# Patient Record
Sex: FEMALE | Race: BLACK OR AFRICAN AMERICAN | NOT HISPANIC OR LATINO | Employment: FULL TIME | URBAN - METROPOLITAN AREA
[De-identification: names, ages, dates, MRNs, and addresses within clinical notes are randomized per-mention and may not be internally consistent; named-entity substitution may affect disease eponyms.]

---

## 2023-07-26 ENCOUNTER — OFFICE VISIT (OUTPATIENT)
Dept: BEHAVIORAL HEALTH | Facility: CLINIC | Age: 58
End: 2023-07-26
Payer: MEDICARE

## 2023-07-26 ENCOUNTER — OFFICE VISIT (OUTPATIENT)
Dept: BARIATRICS/WEIGHT MGMT | Facility: CLINIC | Age: 58
End: 2023-07-26
Payer: MEDICARE

## 2023-07-26 ENCOUNTER — DOCUMENTATION (OUTPATIENT)
Dept: BARIATRICS/WEIGHT MGMT | Facility: CLINIC | Age: 58
End: 2023-07-26
Payer: MEDICARE

## 2023-07-26 VITALS
TEMPERATURE: 97.7 F | HEIGHT: 68 IN | HEART RATE: 95 BPM | BODY MASS INDEX: 43.19 KG/M2 | DIASTOLIC BLOOD PRESSURE: 80 MMHG | WEIGHT: 285 LBS | SYSTOLIC BLOOD PRESSURE: 120 MMHG | OXYGEN SATURATION: 98 %

## 2023-07-26 DIAGNOSIS — E66.01 OBESITY, CLASS III, BMI 40-49.9 (MORBID OBESITY): Primary | ICD-10-CM

## 2023-07-26 DIAGNOSIS — E11.9 TYPE 2 DIABETES MELLITUS WITHOUT COMPLICATION, WITH LONG-TERM CURRENT USE OF INSULIN: ICD-10-CM

## 2023-07-26 DIAGNOSIS — Z71.89 ENCOUNTER FOR PSYCHOLOGICAL ASSESSMENT PRIOR TO BARIATRIC SURGERY: ICD-10-CM

## 2023-07-26 DIAGNOSIS — Z98.84 STATUS POST GASTRIC BANDING: ICD-10-CM

## 2023-07-26 DIAGNOSIS — Z79.4 TYPE 2 DIABETES MELLITUS WITHOUT COMPLICATION, WITH LONG-TERM CURRENT USE OF INSULIN: ICD-10-CM

## 2023-07-26 DIAGNOSIS — R10.13 DYSPEPSIA: ICD-10-CM

## 2023-07-26 DIAGNOSIS — F32.9 REACTIVE DEPRESSION (SITUATIONAL): ICD-10-CM

## 2023-07-26 DIAGNOSIS — E66.01 MORBID OBESITY: Primary | ICD-10-CM

## 2023-07-26 DIAGNOSIS — R53.83 FATIGUE, UNSPECIFIED TYPE: ICD-10-CM

## 2023-07-26 PROBLEM — G47.33 OSA ON CPAP: Status: ACTIVE | Noted: 2023-07-26

## 2023-07-26 PROBLEM — M10.9 GOUT: Status: ACTIVE | Noted: 2023-07-26

## 2023-07-26 PROBLEM — M54.9 BACK PAIN: Status: ACTIVE | Noted: 2023-07-26

## 2023-07-26 PROBLEM — Z99.89 OSA ON CPAP: Status: ACTIVE | Noted: 2023-07-26

## 2023-07-26 PROBLEM — E78.5 HLD (HYPERLIPIDEMIA): Status: ACTIVE | Noted: 2023-07-26

## 2023-07-26 PROBLEM — I10 HTN (HYPERTENSION): Status: ACTIVE | Noted: 2023-07-26

## 2023-07-26 PROBLEM — I25.10 CAD (CORONARY ARTERY DISEASE): Status: ACTIVE | Noted: 2023-07-26

## 2023-07-26 PROBLEM — F41.9 ANXIETY: Status: ACTIVE | Noted: 2023-07-26

## 2023-07-26 NOTE — PROGRESS NOTES
[facility] BARIATRIC SURGERY  [address]  [address]  [facility] [id number] [phone]      Patient  Name:  Montserrat Amato  :  1965      Date of Visit: 2023      Chief Complaint:  weight gain; unable to maintain weight loss      History of Present Illness:  Montserrat Amato is a 57 y.o. female who presents today for evaluation, education and consultation regarding revisional metabolic and bariatric surgery with Dr. London.     Montserrat has been overweight for at least 25+ years, has been 35 pounds or more overweight for at least 25 years, has been 100 pounds or more overweight for 20 or more years and started dieting at age 26.  Previous diet attempts include: Low Carbohydrate, Low Fat, Calorie Counting, Cabbage Soup, Fasting, and Slim Fast; Weight Watchers; Dexatrim and Ionamin/Adipex.  The most weight Montserrat lost was 40 pounds but was unable to maintain that weight loss.  Her maximum lifetime weight is 303 pounds.      Laparoscopic adjustable gastric band placement by Dr. Early in [place] in .  Patient has not had any band fills in several years due to issues with dysphagia in the past.  She currently denies any issues with heartburn, dysphagia, port site tenderness or redness.    Weight prior to lapband: 285; lowest weight achieved 244lbs     She reports recently having a gallbladder ultrasound at [place] for ongoing issues with postprandial nausea following fatty foods.  She cannot recall if she had a HIDA scan.    She has a history of coronary artery disease and is currently on aspirin and Plavix.  She denies ever having any stent placement.  She follows with Gabbie Patton at [facility].  Other past medical history significant for hypertension, hyperlipidemia, obstructive sleep apnea on CPAP, gout on allopurinol, chronic low back pain, anxiety.    She is a type II diabetic and was diagnosed in .  She has been on insulin since 2017.     Complete  history has been obtained and discussed today, as pertinent to metabolic/ bariatric surgery.     Past Medical History:   Diagnosis Date    Anxiety     Back pain     hydrocodone; gabapentin    CAD (coronary artery disease)     no stents; ASA and Plavix. Dr. Gabbie Patton in [facility]    Dyspepsia     Gout     allopurinol    HLD (hyperlipidemia)     HTN (hypertension)     YNES on CPAP     Status post gastric banding     no fills since 2013 due to dysphagia. Dr. Early    Type 2 diabetes mellitus     dx in 2013; on insulin since 2017;     Past Surgical History:   Procedure Laterality Date    COLONOSCOPY  2022    HYSTERECTOMY  2003    total; laparoscopic    LAPAROSCOPIC GASTRIC BANDING  2012    Dr. Early       Allergies   Allergen Reactions    Aspartame Itching       Current Outpatient Medications:     allopurinol (ZYLOPRIM) 300 MG tablet, Take 1 tablet by mouth Daily., Disp: , Rfl:     aspirin 81 MG EC tablet, Take 1 tablet by mouth Daily., Disp: , Rfl:     atorvastatin (LIPITOR) 80 MG tablet, Take 1 tablet by mouth Daily., Disp: , Rfl:     clopidogrel (PLAVIX) 75 MG tablet, Take 1 tablet by mouth Daily., Disp: , Rfl:     dilTIAZem XR (DILACOR XR) 240 MG 24 hr capsule, Take 1 capsule by mouth Daily., Disp: , Rfl:     empagliflozin (JARDIANCE) 25 MG tablet tablet, Take 1 tablet by mouth Daily., Disp: , Rfl:     enalapril (VASOTEC) 20 MG tablet, Take 1 tablet by mouth Daily., Disp: , Rfl:     gabapentin (NEURONTIN) 600 MG tablet, Take 1 tablet by mouth 3 (Three) Times a Day., Disp: , Rfl:     HYDROcodone-acetaminophen (NORCO) 7.5-325 MG per tablet, Take 1 tablet by mouth Every 6 (Six) Hours As Needed for Moderate Pain., Disp: , Rfl:     hydroxychloroquine (PLAQUENIL) 200 MG tablet, Take 1 tablet by mouth 2 (Two) Times a Day., Disp: , Rfl:     Insulin Glargine (LANTUS SOLOSTAR) 100 UNIT/ML injection pen, Inject 32 Units under the skin into the appropriate area as directed 2 (Two) Times a Day., Disp: , Rfl:      vitamin D (ERGOCALCIFEROL) 1.25 MG (06500 UT) capsule capsule, Take 1 capsule by mouth 1 (One) Time Per Week., Disp: , Rfl:     Social History     Socioeconomic History    Marital status:    Tobacco Use    Smoking status: Former     Years: 15.00     Types: Cigarettes     Quit date:      Years since quittin.5    Smokeless tobacco: Never   Vaping Use    Vaping Use: Never used   Substance and Sexual Activity    Alcohol use: Never    Drug use: Never    Sexual activity: Defer     Social History     Social History Narrative    Pt currently resides in [place], [place]. She is  with 2 children. She is currently disabled.        Family History   Problem Relation Age of Onset    Diabetes Mother     Stroke Mother     Heart attack Mother     Diabetes Father     Hypertension Father     Diabetes Sister     Hypertension Sister     Heart attack Sister     Heart attack Maternal Grandmother     Aneurysm Maternal Grandmother     No Known Problems Maternal Grandfather     Breast cancer Paternal Grandmother     Alzheimer's disease Paternal Grandfather        Review of Systems:  Constitutional:  reports fatigue, weight gain and denies fevers, chills.  HEENT:  denies headache, ear pain or loss of hearing, blurred or double vision, nasal discharge or sore throat.  Cardiovascular:  reports HTN, HLD, CAD and denies Atrial Fib, hx heart disease, heart murmur, hx MI, chest pain, palpitations, edema, hx DVT.  Respiratory:  reports dyspnea on exertion, sleep apnea and denies shortness of breath , cough , wheezing, asthma, COPD, hx PE.  Gastrointestinal:  reports dysphagia and denies heartburn, nausea, vomiting, abdominal pain, IBS, diarrhea, constipation, melena, blood in stool, gallbladder issues, liver disease, hx pancreatitis.  Genitourinary:  reports none and denies history of  frequent UTI, incontinence, hematuria, dysuria, polyuria, polydipsia, renal insufficiency, renal failure.    Musculoskeletal:  reports joint  pain, back pain and denies fibromyalgia, arthritis, and autoimmune disease.  Neurological:  reports headaches and denies migraines, numbness /tingling, dizziness, confusion, seizure, stroke.  Psychiatric:  reports hx anxiety and denies depressed mood, hx depression, feeling anxious, bipolar disorder, suicidal ideation, hx suicide attempt, hx self injury, hx substance abuse, eating disorder.  Endocrine:  reports diabetes, gout and denies PCOS, endometriosis, glucose intolerance, thyroid disease.  Hematologic:  reports none and denies bruising, bleeding disorder, hx anemia, hx blood transfusion.  Skin:  reports none and denies rashes, hx MRSA.    Physical Exam:  Vital Signs:  Weight: 129 kg (285 lb)   Body mass index is 43.98 kg/m².  Temp: 97.7 °F (36.5 °C)   Heart Rate: 95   BP: 120/80     Physical Exam  Constitutional:       Appearance: She is obese.   HENT:      Head: Normocephalic and atraumatic.   Eyes:      Extraocular Movements: Extraocular movements intact.      Conjunctiva/sclera: Conjunctivae normal.   Cardiovascular:      Rate and Rhythm: Normal rate and regular rhythm.   Pulmonary:      Effort: Pulmonary effort is normal.      Breath sounds: Normal breath sounds.   Abdominal:      General: Bowel sounds are normal. There is no distension.      Palpations: Abdomen is soft. There is no mass.      Tenderness: There is no abdominal tenderness.      Comments: Old lap scars; port site with no erythema or tenderness   Musculoskeletal:         General: Normal range of motion.      Cervical back: Normal range of motion and neck supple.   Skin:     General: Skin is warm and dry.   Neurological:      General: No focal deficit present.      Mental Status: She is alert and oriented to person, place, and time.   Psychiatric:         Mood and Affect: Mood normal.         Behavior: Behavior normal.         Thought Content: Thought content normal.         Judgment: Judgment normal.       Patient Active Problem List    Diagnosis    Type 2 diabetes mellitus    HTN (hypertension)    HLD (hyperlipidemia)    Gout    Dyspepsia    CAD (coronary artery disease)    Back pain    Anxiety    YNES on CPAP    Status post gastric banding    Obesity, Class III, BMI 40-49.9 (morbid obesity)    Fatigue       Assessment:  57 y.o. female with medically complicated obesity pursuing Revision.    Metabolic & Bariatric Surgery is deemed medically necessary given the following: Class 3 Severe Obesity (BMI >=40). Obesity-related health conditions include the following: obstructive sleep apnea, hypertension, coronary heart disease, diabetes mellitus, and dyslipidemias. Obesity is worsening. BMI is is above average; BMI management plan is completed. We discussed consulting a Bariatric surgeon.          Plan:  Further evaluation will include: CBC, CMP, Lipids, TSH, HgA1C, H.Pylori serum, Pulmonary Function Testing, Gallbladder Eval, Gastric Emptying Study, UGI, and EGD.    Additional clearances needed prior to surgery will include: Cardiology.     **Patient currently has Lapband in place. Will proceed with UGI, followed by EGD. Dr. London to review op note once obtained. Likely to proceed with LSG     Reports recent GBUS at [place]. Will attempt to obtain records and proceed from there.     Patient understands that bariatric surgery is not cosmetic surgery but rather a tool to help make a lifelong commitment to lifestyle changes including diet, exercise and behavior modifications.  The patient has been educated today on those expected postoperative lifestyle changes.  Psychological and Nutritional consultations will be completed prior to surgery.  Instructions on how to access [employer] (an internet based site w/ educational surgical videos) were given to the patient.  Recommended perioperative vitamin supplementation was reviewed.  The importance of avoiding ASA/ NSAIDS/ steroids/ tobacco/nicotine/ hormones/ immunomodulators perioperatively was discussed in  detail.  All questions/concerns have been addressed.      Further input to follow pending the above.           COLETTE Vides

## 2023-07-26 NOTE — PROGRESS NOTES
Bariatric Nutrition Consult     Name: Montserrat Amato   : 1965   AGE: 57 y.o.   MRN: [id number]      Consult Date: 2023     Surgery desired: sleeve    Height: 171.5cm                  Current weight: 285lbs                    BMI: 43.98    Highest weight: unknown                           Lowest weight: 205lbs    Goals: pt wants to lose 130-140lbs        No past medical history on file.                              Diet history reveals 2-3 meals and snacks daily  Breakfast: 3 eggs, landeros with toast and butter/mcmuffin-sausage egg cheese    Lunch: bologna or turkey sandwich wit lettuce tomato onion on 2 sl wheat bread    Dinner: 2 pieces of baked chicken or fish green beans and mashed potatoes    Snacks: chips, pretzels, cheese, crackers, greek yogurt, nuts          Protein sources: meat, fish, chicken, cheese, eggs    Drinks: 3x Aundrea 8, water, occasionally sweet tea    Food allergies/intolerances: none    Night eating: occasionally    Patient has/has not been diagnosed with an eating disorder: no    Exercise/activity: no    Main bariatric nutrition principles discussed and explained. Patient needs to focus on 100g protein daily, 100-140g carbohydrates daily, healthy fat intake, 64 oz fluid daily, no carbonation, and try protein drinks/protein powders. Avoid high fructose corn syrup. Patient verbalized understanding and queries were answered.  Additional nutritional counseling will be available      Justina Montaño RD,LD

## 2023-07-26 NOTE — PROGRESS NOTES
PROGRESS NOTE    Data:    Montserrat Amato is a 57 y.o. female who met with the undersigned for a scheduled psychological evaluation from 12:45 - 1:30 pm.      Clinical Maneuvering/Intervention:      Chief complaint and history of presenting illness/Problems: struggling with obesity for several years. Despite trying different weight loss plans and diets, the pt reported being unsuccessful in losing weight. A psychological evaluation was conducted in order to assess past and current level of functioning. Areas assessed included, but were not limited to: perception of social support, perception of ability to face and deal with challenges in life (positive functioning), anxiety symptoms, depressive symptoms, perspective on beliefs/belief system, coping skills for stress, intelligence level, addiction issues, etc. Therapeutic rapport was established. Interventions conducted today were geared towards assessing the pt's readiness for weight loss surgery and identifying and psychological contraindications for undergoing such a major life change. Social support was deemed strong (specific to weight loss surgery/weight loss in this manner and in a general sense): , siblings, friends. Current psychological struggles were described as low, but included: depression situational to being overweight. At the same time, she talked about how blessed she is in life, that her marriage is satisfying, that she feels supported in life, and how she loves raising her two adopted daughters. Additional coping skills for distress and related to undergoing a major life change such as weight loss surgery/weight loss were deemed strong and included strong isac in God, good sense of humor, follows directions well, intelligent, responsible person, maintains quality relationships with others, and believes in herself that she will be successful with weight loss surgery. The pt endorsed having characteristics of readiness to undergo major life  changes inherent in the journey of weight loss surgery. She could speak to having 'suffered enough,' and the decision to have weight loss surgery has 'clicked' for her. The pt expressed gratitude for today's visit.     Past Family and Social History:      History of family mental health problems: brother (substance abuse)    Psychosocial history: treatment of psychiatric care in the past (N/A), alcohol/substance abuse treatment in the past (N/A) , alcohol/substance abuse problems (N/A), inpatient psychiatric care (N/A).    Mental Status Exam (MSE):  Hygiene:  good  Dress: normal  Attitude:  cooperative and proactive  Motor Activity: normal  Speech: normal  Mood:   nervous and excited  Affect:  congruent  Thought Processes: normal  Thought Content:  normal  Suicidal Thoughts:  not endorsed  Homicidal Thoughts:  not endorsed  Crisis Safety Plan: not needed   Hallucinations:  none      Patient's Support Network Includes:  family, friends      Progress toward goal: there is evidence to suggest that she is taking measures to improve the quality of her life including seeking weight loss surgery      Functional Status: moderate to high      Prognosis: good with weight loss surgery    Evaluation, Diagnoses, and Ability/Capacity to Respond to Treatment:      The pt presented to be struggling with depression situational to being overweight (BMI > 40.00, morbid obesity). Results of MSE demonstrated a functional status of moderate to high. Strengths: belief in self that she will be successful with weight loss surgery, etc (see detailed list of coping skills above). Needed for growth (CPT code requirement for Weaknesses): weight loss.      From a psychological standpoint, the pt presents as a very good candidate for bariatric surgery. She is motivated for the surgery, has showed readiness for the lifestyle change in terms of starting to adjust her eating habits, and seems to have appropriate expectations of how to prepare and  how to live after surgery in order to lose weight successfully.    Treatment Plan:      Short term goals: Start improving her health by following up with her bariatric surgeon in order to receive weight loss surgery as soon as feasible/appropriate and demonstrate success with compliance to adhering to the recommended diet. Long term goals: reach a healthy weight and alleviation of depression via taking control over her health.    Carmen Higuera, PhD, LP

## 2023-07-31 LAB
ALBUMIN SERPL-MCNC: 4.3 G/DL (ref 3.8–4.9)
ALBUMIN/GLOB SERPL: 1.6 {RATIO} (ref 1.2–2.2)
ALP SERPL-CCNC: 118 IU/L (ref 44–121)
ALT SERPL-CCNC: 25 IU/L (ref 0–32)
AST SERPL-CCNC: 16 IU/L (ref 0–40)
BASOPHILS # BLD AUTO: 0.1 X10E3/UL (ref 0–0.2)
BASOPHILS NFR BLD AUTO: 1 %
BILIRUB SERPL-MCNC: 0.3 MG/DL (ref 0–1.2)
BUN SERPL-MCNC: 18 MG/DL (ref 6–24)
BUN/CREAT SERPL: 21 (ref 9–23)
CALCIUM SERPL-MCNC: 9.8 MG/DL (ref 8.7–10.2)
CHLORIDE SERPL-SCNC: 101 MMOL/L (ref 96–106)
CHOLEST SERPL-MCNC: 175 MG/DL (ref 100–199)
CO2 SERPL-SCNC: 29 MMOL/L (ref 20–29)
CREAT SERPL-MCNC: 0.85 MG/DL (ref 0.57–1)
EGFRCR SERPLBLD CKD-EPI 2021: 80 ML/MIN/1.73
EOSINOPHIL # BLD AUTO: 0.1 X10E3/UL (ref 0–0.4)
EOSINOPHIL NFR BLD AUTO: 1 %
ERYTHROCYTE [DISTWIDTH] IN BLOOD BY AUTOMATED COUNT: 15.6 % (ref 11.7–15.4)
GLOBULIN SER CALC-MCNC: 2.7 G/DL (ref 1.5–4.5)
GLUCOSE SERPL-MCNC: 150 MG/DL (ref 70–99)
H PYLORI IGA SER-ACNC: <9 UNITS (ref 0–8.9)
H PYLORI IGG SER IA-ACNC: 0.37 INDEX VALUE (ref 0–0.79)
HBA1C MFR BLD: 9.4 % (ref 4.8–5.6)
HCT VFR BLD AUTO: 46 % (ref 34–46.6)
HDLC SERPL-MCNC: 49 MG/DL
HGB BLD-MCNC: 14.9 G/DL (ref 11.1–15.9)
IMM GRANULOCYTES # BLD AUTO: 0 X10E3/UL (ref 0–0.1)
IMM GRANULOCYTES NFR BLD AUTO: 0 %
LDLC SERPL CALC-MCNC: 92 MG/DL (ref 0–99)
LYMPHOCYTES # BLD AUTO: 3.6 X10E3/UL (ref 0.7–3.1)
LYMPHOCYTES NFR BLD AUTO: 33 %
MCH RBC QN AUTO: 26.8 PG (ref 26.6–33)
MCHC RBC AUTO-ENTMCNC: 32.4 G/DL (ref 31.5–35.7)
MCV RBC AUTO: 83 FL (ref 79–97)
MONOCYTES # BLD AUTO: 0.8 X10E3/UL (ref 0.1–0.9)
MONOCYTES NFR BLD AUTO: 8 %
NEUTROPHILS # BLD AUTO: 6.2 X10E3/UL (ref 1.4–7)
NEUTROPHILS NFR BLD AUTO: 57 %
PLATELET # BLD AUTO: 256 X10E3/UL (ref 150–450)
POTASSIUM SERPL-SCNC: 3.9 MMOL/L (ref 3.5–5.2)
PROT SERPL-MCNC: 7 G/DL (ref 6–8.5)
RBC # BLD AUTO: 5.55 X10E6/UL (ref 3.77–5.28)
SODIUM SERPL-SCNC: 144 MMOL/L (ref 134–144)
TRIGL SERPL-MCNC: 198 MG/DL (ref 0–149)
TSH SERPL DL<=0.005 MIU/L-ACNC: 0.68 UIU/ML (ref 0.45–4.5)
VLDLC SERPL CALC-MCNC: 34 MG/DL (ref 5–40)
WBC # BLD AUTO: 10.8 X10E3/UL (ref 3.4–10.8)

## 2023-09-13 ENCOUNTER — HOSPITAL ENCOUNTER (OUTPATIENT)
Dept: GENERAL RADIOLOGY | Facility: HOSPITAL | Age: 58
Discharge: HOME | End: 2023-09-13
Admitting: PHYSICIAN ASSISTANT
Payer: MEDICARE

## 2023-09-13 DIAGNOSIS — R10.13 DYSPEPSIA: ICD-10-CM

## 2023-09-13 DIAGNOSIS — Z98.84 STATUS POST GASTRIC BANDING: ICD-10-CM

## 2023-09-13 DIAGNOSIS — Z98.84 STATUS POST GASTRIC BANDING: Primary | ICD-10-CM

## 2023-09-13 PROCEDURE — 74240 X-RAY XM UPR GI TRC 1CNTRST: CPT

## 2023-10-06 ENCOUNTER — HOSPITAL ENCOUNTER (OUTPATIENT)
Dept: NUCLEAR MEDICINE | Facility: HOSPITAL | Age: 58
Discharge: HOME | End: 2023-10-06
Payer: MEDICARE

## 2023-10-06 ENCOUNTER — HOSPITAL ENCOUNTER (OUTPATIENT)
Dept: RESPIRATORY THERAPY | Facility: HOSPITAL | Age: 58
Discharge: HOME | End: 2023-10-06
Payer: MEDICARE

## 2023-10-06 VITALS — HEART RATE: 99 BPM | OXYGEN SATURATION: 92 % | RESPIRATION RATE: 16 BRPM

## 2023-10-06 DIAGNOSIS — R53.83 FATIGUE, UNSPECIFIED TYPE: ICD-10-CM

## 2023-10-06 DIAGNOSIS — Z98.84 STATUS POST GASTRIC BANDING: ICD-10-CM

## 2023-10-06 DIAGNOSIS — E11.9 TYPE 2 DIABETES MELLITUS WITHOUT COMPLICATION, WITH LONG-TERM CURRENT USE OF INSULIN: ICD-10-CM

## 2023-10-06 DIAGNOSIS — Z79.4 TYPE 2 DIABETES MELLITUS WITHOUT COMPLICATION, WITH LONG-TERM CURRENT USE OF INSULIN: ICD-10-CM

## 2023-10-06 DIAGNOSIS — E66.01 OBESITY, CLASS III, BMI 40-49.9 (MORBID OBESITY): ICD-10-CM

## 2023-10-06 DIAGNOSIS — R10.13 DYSPEPSIA: ICD-10-CM

## 2023-10-06 PROCEDURE — 94760 N-INVAS EAR/PLS OXIMETRY 1: CPT

## 2023-10-06 PROCEDURE — 94726 PLETHYSMOGRAPHY LUNG VOLUMES: CPT

## 2023-10-06 PROCEDURE — 94799 UNLISTED PULMONARY SVC/PX: CPT

## 2023-10-06 PROCEDURE — A9541 TC99M SULFUR COLLOID: HCPCS | Performed by: NURSE PRACTITIONER

## 2023-10-06 PROCEDURE — 94729 DIFFUSING CAPACITY: CPT

## 2023-10-06 PROCEDURE — 78264 GASTRIC EMPTYING IMG STUDY: CPT

## 2023-10-06 PROCEDURE — 94640 AIRWAY INHALATION TREATMENT: CPT

## 2023-10-06 PROCEDURE — 0 TECHNETIUM SULFUR COLLOID: Performed by: NURSE PRACTITIONER

## 2023-10-06 PROCEDURE — 94060 EVALUATION OF WHEEZING: CPT

## 2023-10-06 RX ORDER — ALBUTEROL SULFATE 2.5 MG/3ML
2.5 SOLUTION RESPIRATORY (INHALATION) ONCE
Status: COMPLETED | OUTPATIENT
Start: 2023-10-06 | End: 2023-10-06

## 2023-10-06 RX ADMIN — TECHNETIUM TC 99M SULFUR COLLOID 1 DOSE: KIT at 10:00

## 2023-10-06 RX ADMIN — ALBUTEROL SULFATE 2.5 MG: 2.5 SOLUTION RESPIRATORY (INHALATION) at 09:31

## 2023-11-01 ENCOUNTER — TELEMEDICINE (OUTPATIENT)
Dept: BARIATRICS/WEIGHT MGMT | Facility: CLINIC | Age: 58
End: 2023-11-01
Payer: MEDICARE

## 2023-11-01 DIAGNOSIS — Z53.21 PATIENT LEFT WITHOUT BEING SEEN: Primary | ICD-10-CM

## 2023-11-01 NOTE — PROGRESS NOTES
[facility] Bariatric Surgery  [address]  [address]  [facility] [id number] [phone]        Patient Name:  Montserrat Amato  :  1965      Date of Visit: 2023      Reason for Visit:   weight gain; unable to maintain weight loss     HPI: Montserrat Amato is a 57 y.o. female s/p LAGB in  Dr. Early in [place], [place] pursuing lapband removal and eventually LSG.     ***    GBUS 3/13/23 with no evidence of gallstones [facility]     HIDA scan: no evidence of acute cholecystitis     Past Medical History:   Diagnosis Date    Anxiety     Back pain     hydrocodone; gabapentin    CAD (coronary artery disease)     no stents; ASA and Plavix. Dr. Gabbie Patton in [place], [place]    Dyspepsia     Gout     allopurinol    HLD (hyperlipidemia)     HTN (hypertension)     YNES on CPAP     Status post gastric banding     no fills since  due to dysphagia. Dr. Early    Type 2 diabetes mellitus     dx in ; on insulin since ;     Past Surgical History:   Procedure Laterality Date    COLONOSCOPY      HYSTERECTOMY      total; laparoscopic    LAPAROSCOPIC GASTRIC BANDING      Dr. Early     No outpatient medications have been marked as taking for the 23 encounter (Appointment) with Brielle Yap PA.       Allergies   Allergen Reactions    Aspartame Itching       Social History     Socioeconomic History    Marital status:    Tobacco Use    Smoking status: Former     Years: 15     Types: Cigarettes     Quit date: 2006     Years since quittin.8    Smokeless tobacco: Never   Vaping Use    Vaping Use: Never used   Substance and Sexual Activity    Alcohol use: Never    Drug use: Never    Sexual activity: Defer     Social History     Social History Narrative    Pt currently resides in [place], [place]. She is  with 2 children. She is currently disabled.        Review of Systems   General: Denies fever, chills, unintentional weight loss   HEENT: Denies  nasal congestion, change in vision, ear pain, change in hearing, soar throat   CARDIAC: Denies chest pain, palpitations, edema   RESP: denies shortness of breath, cough, wheezing   GI: Denies abdominal pain, nausea, vomiting, diarrhea, constipation, reflux   Urinary: Denies polyuria, dysuria, hematuria   MSK: denies joint pain, swelling, gout, joint stiffness   Neuro: Denies seizures, weakness, numbness/tingling, LOC   Heme/Endo: denies bleeding, bruising, heat/cold intolerance, sweating   Psych: denies depression, anxiety    There were no vitals taken for this visit.    Physical Exam      Assessment:    No diagnosis found.       {Class 3 Severe Obesity (BMI >=40). (Optional):30809}      Plan:  ***

## 2023-11-02 ENCOUNTER — TELEMEDICINE (OUTPATIENT)
Dept: BARIATRICS/WEIGHT MGMT | Facility: CLINIC | Age: 58
End: 2023-11-02

## 2023-11-02 DIAGNOSIS — Z98.84 STATUS POST GASTRIC BANDING: ICD-10-CM

## 2023-11-02 DIAGNOSIS — E66.01 OBESITY, CLASS III, BMI 40-49.9 (MORBID OBESITY): Primary | ICD-10-CM

## 2023-11-02 NOTE — PROGRESS NOTES
"[facility] Bariatric Surgery  [address]  [address]  [facility] [id number] [phone]    This visit was conducted as a video visit, in an effort to limit spread of the novel coronavirus during the COVID-19 pandemic.  The patient gave consent.     Patient Name:  Montserrat Amato  :  1965      Date of Visit: 2023      Reason for Visit:   weight gain; unable to maintain weight loss     HPI: Montserrat Amato is a 57 y.o. female s/p LAGB in 2012 Dr. Early in [place], [place] pursuing lapband removal and eventually LSG     Initial office visit 23:     \"Montserrat has been overweight for at least 25+ years, has been 35 pounds or more overweight for at least 25 years, has been 100 pounds or more overweight for 20 or more years and started dieting at age 26.  Previous diet attempts include: Low Carbohydrate, Low Fat, Calorie Counting, Cabbage Soup, Fasting, and Slim Fast; Weight Watchers; Dexatrim and Ionamin/Adipex.  The most weight Montserrat lost was 40 pounds but was unable to maintain that weight loss.  Her maximum lifetime weight is 303 pounds.       Laparoscopic adjustable gastric band placement by Dr. Early in [place] in .  Patient has not had any band fills in several years due to issues with dysphagia in the past.  She currently denies any issues with heartburn, dysphagia, port site tenderness or redness.     Weight prior to lapband: 285; lowest weight achieved 244lbs      She reports recently having a gallbladder ultrasound at [place] for ongoing issues with postprandial nausea following fatty foods.  She cannot recall if she had a HIDA scan.     She has a history of coronary artery disease and is currently on aspirin and Plavix.  She denies ever having any stent placement.  She follows with Gabbie Patton at [facility].  Other past medical history significant for hypertension, hyperlipidemia, obstructive sleep apnea on CPAP, gout on allopurinol, chronic " "low back pain, anxiety.     She is a type II diabetic and was diagnosed in 2013.  She has been on insulin since 2017.\"    Recent imaging:     GBUS 3/13/23 with no evidence of gallstones [facility]      HIDA scan: no evidence of acute cholecystitis. EF could not be calculated.     UGI 9/13/23:     IMPRESSION:    Proximal to the LAP-BAND there is fairly moderate distal esophageal  dilatation.    GES:    IMPRESSION:  Results are not suggestive of gastric outlet obstruction or  gastroparesis.    Today's update:     Doing well with no major medical changes or illness. She is eager to proceed with EGD. Denies abdominal pain, nausea, vomiting, dysphagia, reflux. She is ready to have her band removed.     Past Medical History:   Diagnosis Date    Anxiety     Back pain     hydrocodone; gabapentin    CAD (coronary artery disease)     no stents; ASA and Plavix. Dr. Gabbie Patton in [facility]    Dyspepsia     Gout     allopurinol    HLD (hyperlipidemia)     HTN (hypertension)     YNES on CPAP     Status post gastric banding     no fills since 2013 due to dysphagia. Dr. Early    Type 2 diabetes mellitus     dx in 2013; on insulin since 2017;     Past Surgical History:   Procedure Laterality Date    COLONOSCOPY  2022    HYSTERECTOMY  2003    total; laparoscopic    LAPAROSCOPIC GASTRIC BANDING  2012    Dr. Early     Outpatient Medications Marked as Taking for the 11/2/23 encounter (Telemedicine) with Brielle Yap PA   Medication Sig Dispense Refill    allopurinol (ZYLOPRIM) 300 MG tablet Take 1 tablet by mouth Daily.      aspirin 81 MG EC tablet Take 1 tablet by mouth Daily.      atorvastatin (LIPITOR) 80 MG tablet Take 1 tablet by mouth Daily.      clopidogrel (PLAVIX) 75 MG tablet Take 1 tablet by mouth Daily.      dilTIAZem XR (DILACOR XR) 240 MG 24 hr capsule Take 1 capsule by mouth Daily.      empagliflozin (JARDIANCE) 25 MG tablet tablet Take 1 tablet by mouth Daily.      enalapril (VASOTEC) 20 MG tablet Take 1 " tablet by mouth Daily.      gabapentin (NEURONTIN) 600 MG tablet Take 1 tablet by mouth 3 (Three) Times a Day.      HYDROcodone-acetaminophen (NORCO) 7.5-325 MG per tablet Take 1 tablet by mouth Every 6 (Six) Hours As Needed for Moderate Pain.      hydroxychloroquine (PLAQUENIL) 200 MG tablet Take 1 tablet by mouth 2 (Two) Times a Day.      Insulin Glargine (LANTUS SOLOSTAR) 100 UNIT/ML injection pen Inject 32 Units under the skin into the appropriate area as directed 2 (Two) Times a Day.      vitamin D (ERGOCALCIFEROL) 1.25 MG (84030 UT) capsule capsule Take 1 capsule by mouth 1 (One) Time Per Week.         Allergies   Allergen Reactions    Aspartame Itching       Social History     Socioeconomic History    Marital status:    Tobacco Use    Smoking status: Former     Years: 15     Types: Cigarettes     Quit date:      Years since quittin.8    Smokeless tobacco: Never   Vaping Use    Vaping Use: Never used   Substance and Sexual Activity    Alcohol use: Never    Drug use: Never    Sexual activity: Defer     Social History     Social History Narrative    Pt currently resides in [place], [place]. She is  with 2 children. She is currently disabled.        Review of Systems   General: Positive for fatigue and weight gain.Denies fever, chills, unintentional weight loss   HEENT: Denies nasal congestion, change in vision, ear pain, change in hearing, soar throat   CARDIAC: Denies chest pain, palpitations, edema   RESP: denies shortness of breath, cough, wheezing   GI: Denies abdominal pain, nausea, vomiting, diarrhea, constipation, reflux   Urinary: Denies polyuria, dysuria, hematuria   MSK: denies joint pain, swelling, gout, joint stiffness   Neuro: Denies seizures, weakness, numbness/tingling, LOC   Heme/Endo: denies bleeding, bruising, heat/cold intolerance, sweating   Psych: denies depression, anxiety    There were no vitals taken for this visit.    Physical Exam  Constitutional:       General: She  is not in acute distress.  Pulmonary:      Effort: Pulmonary effort is normal.   Neurological:      Mental Status: She is alert and oriented to person, place, and time.   Psychiatric:         Mood and Affect: Mood normal.         Behavior: Behavior normal.         Thought Content: Thought content normal.         Judgment: Judgment normal.           Assessment:      ICD-10-CM ICD-9-CM   1. Obesity, Class III, BMI 40-49.9 (morbid obesity)  E66.01 278.01   2. Status post gastric banding  Z98.84 V45.86            Plan:  Will proceed with EGD. The risks and benefits of the upper endoscopy were discussed with the patient in detail and all questions were answered.  Possibility of perforation, bleeding, aspiration, and anesthesia reaction were reviewed.  Patient agrees to proceed.

## 2023-11-06 ENCOUNTER — LAB REQUISITION (OUTPATIENT)
Dept: LAB | Facility: HOSPITAL | Age: 58
End: 2023-11-06
Payer: MEDICARE

## 2023-11-06 ENCOUNTER — OUTSIDE FACILITY SERVICE (OUTPATIENT)
Dept: BARIATRICS/WEIGHT MGMT | Facility: CLINIC | Age: 58
End: 2023-11-06
Payer: MEDICARE

## 2023-11-06 DIAGNOSIS — K30 FUNCTIONAL DYSPEPSIA: ICD-10-CM

## 2023-11-06 PROCEDURE — 88342 IMHCHEM/IMCYTCHM 1ST ANTB: CPT | Performed by: SURGERY

## 2023-11-06 PROCEDURE — 88305 TISSUE EXAM BY PATHOLOGIST: CPT | Performed by: SURGERY

## 2023-11-08 DIAGNOSIS — R10.13 DYSPEPSIA: ICD-10-CM

## 2023-11-08 DIAGNOSIS — Z98.84 STATUS POST GASTRIC BANDING: ICD-10-CM

## 2023-11-09 ENCOUNTER — TELEPHONE (OUTPATIENT)
Dept: BARIATRICS/WEIGHT MGMT | Facility: CLINIC | Age: 58
End: 2023-11-09
Payer: MEDICARE

## 2023-11-09 NOTE — TELEPHONE ENCOUNTER
Discussed with patient and she will have her cardiologist send over clearance letter. Will have insurance work on precert then arrange for office visit to discuss.     From: Tim London MD  Sent: 11/6/2023  10:03 AM EST  To: COLETTE Vides    It looks like she has severe H. pylori gastritis.  Please follow-up on the biopsies and treat her if positive.  Okay to schedule [facility], main OR [facility], cards clearance, no need to stop ASA/Plavix.  Thanks!

## 2023-11-13 LAB
CYTO UR: NORMAL
LAB AP CASE REPORT: NORMAL
LAB AP CLINICAL INFORMATION: NORMAL
LAB AP SPECIAL STAINS: NORMAL
PATH REPORT.FINAL DX SPEC: NORMAL
PATH REPORT.GROSS SPEC: NORMAL

## 2023-11-20 ENCOUNTER — TELEPHONE (OUTPATIENT)
Dept: BARIATRICS/WEIGHT MGMT | Facility: CLINIC | Age: 58
End: 2023-11-20
Payer: MEDICARE

## 2023-11-20 NOTE — TELEPHONE ENCOUNTER
Elaine with [facility] called, stated that she needs form with sx type and physician on it for cardiac clearance.     I faxed her the cardiac clearance form that we us.